# Patient Record
Sex: FEMALE | Race: WHITE | Employment: OTHER
[De-identification: names, ages, dates, MRNs, and addresses within clinical notes are randomized per-mention and may not be internally consistent; named-entity substitution may affect disease eponyms.]

---

## 2017-08-05 ENCOUNTER — HEALTH MAINTENANCE LETTER (OUTPATIENT)
Age: 27
End: 2017-08-05

## 2019-08-07 ENCOUNTER — TELEPHONE (OUTPATIENT)
Dept: OPHTHALMOLOGY | Facility: CLINIC | Age: 29
End: 2019-08-07

## 2019-08-07 NOTE — TELEPHONE ENCOUNTER
Mother is calling asking how often should her daughter get her eyes checked? She wants  her to see  Dr Ramirez.

## 2019-09-04 ENCOUNTER — OFFICE VISIT (OUTPATIENT)
Dept: OPHTHALMOLOGY | Facility: CLINIC | Age: 29
End: 2019-09-04
Payer: COMMERCIAL

## 2019-09-04 DIAGNOSIS — H52.223 REGULAR ASTIGMATISM OF BOTH EYES: Primary | ICD-10-CM

## 2019-09-04 PROCEDURE — 92004 COMPRE OPH EXAM NEW PT 1/>: CPT | Performed by: OPHTHALMOLOGY

## 2019-09-04 PROCEDURE — 92015 DETERMINE REFRACTIVE STATE: CPT | Performed by: OPHTHALMOLOGY

## 2019-09-04 RX ORDER — BUSPIRONE HYDROCHLORIDE 15 MG/1
1 TABLET ORAL 2 TIMES DAILY
Refills: 5 | COMMUNITY
Start: 2019-08-03

## 2019-09-04 RX ORDER — TIMOLOL MALEATE 5 MG/ML
1 SOLUTION/ DROPS OPHTHALMIC EVERY MORNING
Refills: 2 | COMMUNITY
Start: 2019-07-31

## 2019-09-04 RX ORDER — DEXTROAMPHETAMINE SACCHARATE, AMPHETAMINE ASPARTATE MONOHYDRATE, DEXTROAMPHETAMINE SULFATE AND AMPHETAMINE SULFATE 6.25; 6.25; 6.25; 6.25 MG/1; MG/1; MG/1; MG/1
1 CAPSULE, EXTENDED RELEASE ORAL DAILY
Refills: 0 | COMMUNITY
Start: 2019-08-09

## 2019-09-04 ASSESSMENT — REFRACTION
OS_AXIS: 095
OS_CYLINDER: +0.75
OS_SPHERE: -0.50
OD_AXIS: 085
OD_CYLINDER: +0.75
OD_SPHERE: -0.75

## 2019-09-04 ASSESSMENT — VISUAL ACUITY
OS_SC: 20/20
OD_CC+: -1
OD_CC: J1+
OD_CC: 20/20
CORRECTION_TYPE: GLASSES
OS_CC: 20/20
OS_CC+: -2
OS_CC: J1-
METHOD: SNELLEN - LINEAR
OD_SC: 20/20

## 2019-09-04 ASSESSMENT — SLIT LAMP EXAM - LIDS
COMMENTS: NORMAL
COMMENTS: NORMAL

## 2019-09-04 ASSESSMENT — REFRACTION_MANIFEST
OD_AXIS: 080
OS_SPHERE: -0.75
OD_CYLINDER: +0.25
OS_CYLINDER: +0.50
OD_SPHERE: -0.75
OS_AXIS: 100

## 2019-09-04 ASSESSMENT — CONF VISUAL FIELD
OS_NORMAL: 1
OD_NORMAL: 1
METHOD: COUNTING FINGERS

## 2019-09-04 ASSESSMENT — CUP TO DISC RATIO
OS_RATIO: 0.0
OD_RATIO: 0.0

## 2019-09-04 ASSESSMENT — REFRACTION_WEARINGRX
OD_CYLINDER: SPHERE
OD_SPHERE: -0.25
OD_ADD: +1.00
OS_ADD: +1.00
OS_SPHERE: +0.25
OS_CYLINDER: SPHERE
SPECS_TYPE: BIFOCAL

## 2019-09-04 ASSESSMENT — TONOMETRY
IOP_METHOD: APPLANATION
OD_IOP_MMHG: 11
OS_IOP_MMHG: 13

## 2019-09-04 ASSESSMENT — EXTERNAL EXAM - LEFT EYE: OS_EXAM: NORMAL

## 2019-09-04 ASSESSMENT — EXTERNAL EXAM - RIGHT EYE: OD_EXAM: NORMAL

## 2019-09-04 NOTE — LETTER
9/4/2019         RE: Jennifer Sowada  [address]  [facility] [id number]        Dear Colleague,    Thank you for referring your patient, Jennifer Sowada, to the [facility]. Please see a copy of my visit note below.     Current Eye Medications:  none     Subjective:  Complete eye exam. Vision is OK in distance, but can see better with glasses off in distance. Vision is blurred at computer distance both eyes, patient works on computer 4 hours a day. No eye pain or discomfort in either eye. Patients mother is concerned about patient using reading glasses around the house.     Objective:  See Ophthalmology Exam.       Assessment:  Baseline eye exam in patient with mild myopia/astigmatism.      Plan:  Glasses Rx given - optional   Could try a +1.00 or +1.25 for computer or reading.  Call in May 2021 for an appointment in September 2021 for Complete Exam    Dr. Raymundo [phone]           Again, thank you for allowing me to participate in the care of your patient.        Sincerely,        Jan Raymundo MD

## 2019-09-04 NOTE — PATIENT INSTRUCTIONS
Glasses Rx given - optional   Could try a +1.00 or +1.25 for computer or reading.  Call in May 2021 for an appointment in September 2021 for Complete Exam    Dr. Raymundo [phone]

## 2019-09-04 NOTE — PROGRESS NOTES
Current Eye Medications:  none     Subjective:  Complete eye exam. Vision is OK in distance, but can see better with glasses off in distance. Vision is blurred at computer distance both eyes, patient works on computer 4 hours a day. No eye pain or discomfort in either eye. Patients mother is concerned about patient using reading glasses around the house.     Objective:  See Ophthalmology Exam.       Assessment:  Baseline eye exam in patient with mild myopia/astigmatism.      Plan:  Glasses Rx given - optional   Could try a +1.00 or +1.25 for computer or reading.  Call in May 2021 for an appointment in September 2021 for Complete Exam    Dr. Raymundo [phone]